# Patient Record
Sex: MALE | Race: WHITE | Employment: FULL TIME | URBAN - METROPOLITAN AREA
[De-identification: names, ages, dates, MRNs, and addresses within clinical notes are randomized per-mention and may not be internally consistent; named-entity substitution may affect disease eponyms.]

---

## 2018-12-28 ENCOUNTER — HOSPITAL ENCOUNTER (EMERGENCY)
Facility: CLINIC | Age: 63
Discharge: HOME | End: 2018-12-28
Attending: EMERGENCY MEDICINE | Admitting: EMERGENCY MEDICINE
Payer: COMMERCIAL

## 2018-12-28 VITALS
SYSTOLIC BLOOD PRESSURE: 137 MMHG | HEART RATE: 65 BPM | RESPIRATION RATE: 16 BRPM | DIASTOLIC BLOOD PRESSURE: 90 MMHG | BODY MASS INDEX: 30.8 KG/M2 | HEIGHT: 74 IN | WEIGHT: 240 LBS | TEMPERATURE: 97.1 F | OXYGEN SATURATION: 97 %

## 2018-12-28 DIAGNOSIS — S01.01XA LACERATION OF SCALP, INITIAL ENCOUNTER: ICD-10-CM

## 2018-12-28 DIAGNOSIS — I10 HYPERTENSION, UNSPECIFIED TYPE: ICD-10-CM

## 2018-12-28 PROCEDURE — 90471 IMMUNIZATION ADMIN: CPT

## 2018-12-28 PROCEDURE — 99283 EMERGENCY DEPT VISIT LOW MDM: CPT | Mod: 25

## 2018-12-28 PROCEDURE — 12002 RPR S/N/AX/GEN/TRNK2.6-7.5CM: CPT

## 2018-12-28 PROCEDURE — 90715 TDAP VACCINE 7 YRS/> IM: CPT | Performed by: EMERGENCY MEDICINE

## 2018-12-28 PROCEDURE — 25000128 H RX IP 250 OP 636: Performed by: EMERGENCY MEDICINE

## 2018-12-28 RX ORDER — ACETAMINOPHEN 325 MG/1
650 TABLET ORAL EVERY 6 HOURS PRN
Qty: 20 TABLET | Refills: 0 | Status: SHIPPED | OUTPATIENT
Start: 2018-12-28 | End: 2019-01-27

## 2018-12-28 RX ADMIN — CLOSTRIDIUM TETANI TOXOID ANTIGEN (FORMALDEHYDE INACTIVATED), CORYNEBACTERIUM DIPHTHERIAE TOXOID ANTIGEN (FORMALDEHYDE INACTIVATED), BORDETELLA PERTUSSIS TOXOID ANTIGEN (GLUTARALDEHYDE INACTIVATED), BORDETELLA PERTUSSIS FILAMENTOUS HEMAGGLUTININ ANTIGEN (FORMALDEHYDE INACTIVATED), BORDETELLA PERTUSSIS PERTACTIN ANTIGEN, AND BORDETELLA PERTUSSIS FIMBRIAE 2/3 ANTIGEN 0.5 ML: 5; 2; 2.5; 5; 3; 5 INJECTION, SUSPENSION INTRAMUSCULAR at 15:51

## 2018-12-28 ASSESSMENT — ENCOUNTER SYMPTOMS
HEADACHES: 0
NAUSEA: 0
VOMITING: 0
WOUND: 1

## 2018-12-28 ASSESSMENT — MIFFLIN-ST. JEOR: SCORE: 1948.38

## 2018-12-28 NOTE — ED AVS SNAPSHOT
[facility] Emergency Department  [address]  [facility] [address]  Phone:  [phone]  Fax:  [phone]                                    Darren Giron   MRN: [id number]    Department:  [facility] Emergency Department   Date of Visit:  12/28/2018           After Visit Summary Signature Page    I have received my discharge instructions, and my questions have been answered. I have discussed any challenges I see with this plan with the nurse or doctor.    ..........................................................................................................................................  Patient/Patient Representative Signature      ..........................................................................................................................................  Patient Representative Print Name and Relationship to Patient    ..................................................               ................................................  Date                                   Time    ..........................................................................................................................................  Reviewed by Signature/Title    ...................................................              ..............................................  Date                                               Time          22EPIC Rev 08/18

## 2018-12-28 NOTE — ED PROVIDER NOTES
"  History     Chief Complaint:  Head Laceration    HPI   Darren Giorn is a 63 year old male who presents to the ED for evaluation of head laceration. The patient reports he was at [employer] when he went to reach a ball under a low hoop and hit his head on the backboard when he stood up. His does not recall his last tetanus but believes it has been over 10 years. He does not take blood thinners.The patient denies any loss of consciousness, headache, visual disturbance, nausea, vomiting, or other injuries.     Allergies:  No known drug allergies    Medications:    The patient is not currently taking any prescribed medications.    Past Medical History:    The patient does not have any past pertinent medical history.    Past Surgical History:    Cataract surgery, 12/18/2018    Family History:    History reviewed. No pertinent family history.     Social History:  Presents to ED with wife       Review of Systems   Eyes: Negative for visual disturbance.   Gastrointestinal: Negative for nausea and vomiting.   Skin: Positive for wound.   Neurological: Negative for syncope and headaches.   All other systems reviewed and are negative.    Physical Exam     Patient Vitals for the past 24 hrs:   BP Temp Temp src Pulse Resp SpO2 Height Weight   12/28/18 1625 137/90 -- -- -- -- -- -- --   12/28/18 1510 (!) 161/107 97.1  F (36.2  C) Oral 65 16 97 % 1.88 m (6' 2\") 108.9 kg (240 lb)     Physical Exam  Nursing note and vitals reviewed.  Constitutional: Well nourished. Resting comfortably.   Eyes: Conjunctiva normal.  Pupils are equal, round, and reactive to light.   ENT: Nose normal. Mucous membranes pink and moist.    Neck: Normal range of motion.  MSK: No calf tenderness or swelling.  Neuro: Awake, alert. Speech is normal and fluent. Face is symmetric. EOMI. PERRL. Moves all extremities. Normal finger-nose-finger.  strength equal bilaterally. Equal sensation bilaterally on UE/LE and face. No arm or leg drift. Gait stable "   Skin: Skin is warm and dry. No rash noted. 3cm laceration to parietal scalp, no active bleeding, no bony or galeal involvement  Psychiatric: Normal affect.     Emergency Department Course     Procedures:     Laceration Repair        LACERATION:  A simple and superficial clean 3 cm laceration.      LOCATION:  Scalp      ANESTHESIA:  Local using 1% Lidocaine with Epinephrine total of 2 mLs      PREPARATION:  Irrigation and Scrubbing with Normal Saline and Shur Clens      DEBRIDEMENT:  no debridement      CLOSURE:  Wound was closed with 2 Staples    Interventions:  1551: Tdap 0.5mLs IM    Emergency Department Course:  Past medical records, nursing notes, and vitals reviewed.  1515: I performed an exam of the patient and obtained history, as documented above.    1547: I performed the laceration repair as noted above.    Findings and plan explained to the Patient and spouse. Patient discharged home with instructions regarding supportive care, medications, and reasons to return. The importance of close follow-up was reviewed.     Impression & Plan      Medical Decision Making:  Darren Giron is a 63 year old male presented with a scalp laceration.  Tetanus was updated. The wound was carefully evaluated and explored.  The laceration was closed with staples as noted herein.  There is no evidence of bony damage with this laceration.  Possible complications (infection, scarring) were reviewed with the patient and/or family.  I also discussed signs of infection including redness, warmth, foul-smelling drainage and worsening pain and instructed the patient to return promptly to the ER for re-evaluation should any of these develop.      This patient denies severe headache, seizure, and has no focal neurological findings.  The patient did not have prolonged LOC, sleepiness, repeated emesis, poor orientation, or significant irritability.  I have discussed the risk/benefit analysis with the patient and/or family regarding CT  "imaging.  We have decided to hold off on this at this time.  The patient/family understand that they must return if any \"red flags\" appear/develop in the coming hours/days, as this may represent an indication to perform a CT scan.  I have noted that \"red flags\" include but are not limited to: worsening headaches, increased drowsiness, strange behavior, repetitive speech, seizures, repeated vomiting, growing confusion, increased irritability, slurred speech, weakness or numbness, and loss of responsiveness.  This information will also be provided in writing at discharge.  I have discussed second impact syndrome, and the importance of not sustaining repeated concussion in the next 1-2 weeks.  Post concussive syndrome was also discussed.  Finally patient with mild hypertension during time in ED, discussed to monitor at home with BP diary and discuss with PCP at f/u appt.     Diagnosis:    ICD-10-CM   1. Laceration of scalp, initial encounter S01.01XA   2. Hypertension, unspecified type I10     Disposition: Patient discharged to home with wife      Discharge Medications:  acetaminophen 325 MG tablet  Commonly known as:  TYLENOL  650 mg, Oral, EVERY 6 HOURS PRN       Alyce Farmer  12/28/2018   [facility] EMERGENCY DEPARTMENT    Scribe Disclosure:  I, Alyce Farmer, am serving as a scribe at 3:15 PM on 12/28/2018 to document services personally performed by Monica Morales DO based on my observations and the provider's statements to me.        Monica Morales DO  12/28/18 1809    "

## (undated) RX ORDER — LIDOCAINE HYDROCHLORIDE AND EPINEPHRINE 10; 10 MG/ML; UG/ML
INJECTION, SOLUTION INFILTRATION; PERINEURAL
Status: DISPENSED
Start: 2018-12-28